# Patient Record
(demographics unavailable — no encounter records)

---

## 2025-07-02 NOTE — ASSESSMENT
[FreeTextEntry1] : 7mo ex full term female with pmh back hemangioma who is here for initial evaluation of abnormal movements that started at the end of May, possibly following prevnar vaccine that she had had a dose of before. Movements initially were happening daily, moving her head from side to side like shaking her head no for minutes at a time, would not stop if asked, but parents never tried to hold her head to suppress movement. Movements have since resolved. Patient is developing well, appropriately, normal neuro exam, no family history. Low suspicion for seizures but will get routine EEG to check background. If movements come back, will consider prolonged study.

## 2025-07-02 NOTE — PHYSICAL EXAM
[Well-appearing] : well-appearing [Normocephalic] : normocephalic [Anterior fontanel- Open] : anterior fontanel- open [Anterior fontanel- Soft] : anterior fontanel- soft [Anterior fontanel- Flat] : anterior fontanel- flat [No dysmorphic facial features] : no dysmorphic facial features [Alert] : alert [Regards] : regards [Smiling] : smiling [Cooing] : cooing [Babbling] : babbling [Pupils reactive to light] : pupils reactive to light [Turns to light] : turns to light [Tracks face, light or objects with full extraocular movements] : tracks face, light or objects with full extraocular movements [No facial asymmetry or weakness] : no facial asymmetry or weakness [Responds to voice/sounds] : responds to voice/sounds [Midline tongue] : midline tongue [Normal axial and appendicular muscle tone with symmetric limb movements] : normal axial and appendicular muscle tone with symmetric limb movements [Normal bulk] : normal bulk [Reaches for toys] : reaches for toys [Good  bilaterally] : good  bilaterally [Lift head in prone] : lift head in prone [Roll over] : roll over [Sits without support] : sits without support [No abnormal involuntary movements] : no abnormal involuntary movements [2+ biceps] : 2+ biceps [Knee jerks] : knee jerks [Ankle jerks] : ankle jerks [No ankle clonus] : no ankle clonus [No dysmetria in reaching for objects] : no dysmetria in reaching for objects [Good sitting balance] : good sitting balance [de-identified] : 3cm hemangioma on left back, on the side

## 2025-07-02 NOTE — REASON FOR VISIT
[Initial Consultation] : an initial consultation for [Other: ____] : [unfilled] [Mother] : mother [Father] : father

## 2025-07-02 NOTE — DEVELOPMENTAL MILESTONES
[[place]] : [place] [Imitate speech/sounds] : imitate speech/sounds [Single syllables (ah,eh,oh)] : single syllables (ah,eh,oh) [Sit - no support, leaning forward] : sit - no support, leaning forward [Pulls to sit - no head lag] : pulls to sit - no head lag [Roll over] : roll over

## 2025-07-02 NOTE — HISTORY OF PRESENT ILLNESS
[FreeTextEntry1] : patient had her second prevnar vaccine and about 3 days after that she started to do movements of shaking her head back and forth  she did these movements a few times a day, would do it for a good minute or two  would do it multiple times a day, no correlation with nap time or being tired or waking up she hasnt done it for a few weeks no no other abnormal movements  patient is sitting up, babbling, smiling, grabbing at things  born full term, no complications  in nicu one day because mom had a fever  eating well, peeing, pooping  starting solids fine exclusively breast fed, on vitamin D and iron